# Patient Record
Sex: FEMALE | Race: WHITE | NOT HISPANIC OR LATINO | Employment: UNEMPLOYED | URBAN - METROPOLITAN AREA
[De-identification: names, ages, dates, MRNs, and addresses within clinical notes are randomized per-mention and may not be internally consistent; named-entity substitution may affect disease eponyms.]

---
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
## 2023-11-10 ENCOUNTER — LAB (OUTPATIENT)
Dept: LAB | Facility: HOSPITAL | Age: 18
End: 2023-11-10
Payer: COMMERCIAL

## 2023-11-10 ENCOUNTER — TRANSCRIBE ORDERS (OUTPATIENT)
Dept: LAB | Facility: HOSPITAL | Age: 18
End: 2023-11-10
Payer: COMMERCIAL

## 2023-11-10 DIAGNOSIS — N92.6 IRREGULAR MENSTRUAL CYCLE: ICD-10-CM

## 2023-11-10 DIAGNOSIS — N92.6 IRREGULAR MENSTRUAL CYCLE: Primary | ICD-10-CM

## 2023-11-10 LAB
FSH SERPL-ACNC: 2.04 MIU/ML
LH SERPL-ACNC: 4.05 MIU/ML
PROGEST SERPL-MCNC: 8.57 NG/ML
PROLACTIN SERPL-MCNC: 14.4 NG/ML (ref 4.79–23.3)
TSH SERPL DL<=0.05 MIU/L-ACNC: 3.09 UIU/ML (ref 0.27–4.2)

## 2023-11-10 PROCEDURE — 84146 ASSAY OF PROLACTIN: CPT

## 2023-11-10 PROCEDURE — 84443 ASSAY THYROID STIM HORMONE: CPT

## 2023-11-10 PROCEDURE — 82672 ASSAY OF ESTROGEN: CPT

## 2023-11-10 PROCEDURE — 83002 ASSAY OF GONADOTROPIN (LH): CPT

## 2023-11-10 PROCEDURE — 83001 ASSAY OF GONADOTROPIN (FSH): CPT

## 2023-11-10 PROCEDURE — 36415 COLL VENOUS BLD VENIPUNCTURE: CPT

## 2023-11-10 PROCEDURE — 84144 ASSAY OF PROGESTERONE: CPT

## 2023-11-14 LAB — ESTROGEN SERPL-MCNC: 217 PG/ML

## 2024-08-09 ENCOUNTER — NURSE TRIAGE (OUTPATIENT)
Dept: CALL CENTER | Facility: HOSPITAL | Age: 19
End: 2024-08-09
Payer: COMMERCIAL

## 2024-08-09 NOTE — TELEPHONE ENCOUNTER
"Patient c/o chest wall pain on the right side of her chest. Denies any associated symptoms. Patient would like to establish care with new primary care provider. Reviewed protocol with patient. Advised to go to Urgent Care if not able to see new PCP soon. Connected patient with Rebecca at requested primary care office to schedule appointment.    Reason for Disposition   [1] Chest pain(s) lasting a few seconds AND [2] persists > 3 days    Additional Information   Negative: SEVERE difficulty breathing (e.g., struggling for each breath, speaks in single words)   Negative: Difficult to awaken or acting confused (e.g., disoriented, slurred speech)   Negative: Shock suspected (e.g., cold/pale/clammy skin, too weak to stand, low BP, rapid pulse)   Negative: Passed out (i.e., lost consciousness, collapsed and was not responding)   Negative: [1] Chest pain lasts > 5 minutes AND [2] age > 44   Negative: [1] Chest pain lasts > 5 minutes AND [2] age > 30 AND [3] one or more cardiac risk factors (e.g., diabetes, high blood pressure, high cholesterol, smoker, or strong family history of heart disease)   Negative: [1] Chest pain lasts > 5 minutes AND [2] history of heart disease (i.e., angina, heart attack, heart failure, bypass surgery, takes nitroglycerin)   Negative: [1] Chest pain lasts > 5 minutes AND [2] described as crushing, pressure-like, or heavy   Negative: Heart beating < 50 beats per minute OR > 140 beats per minute   Negative: Visible sweat on face or sweat dripping down face   Negative: Sounds like a life-threatening emergency to the triager   Negative: Followed a chest injury   Negative: SEVERE chest pain   Negative: [1] Chest pain (or \"angina\") comes and goes AND [2] is happening more often (increasing in frequency) or getting worse (increasing in severity)  (Exception: Chest pains that last only a few seconds.)   Negative: Pain also in shoulder(s) or arm(s) or jaw  (Exception: Pain is clearly made worse by " "movement.)   Negative: Difficulty breathing   Negative: Dizziness or lightheadedness   Negative: Coughing up blood   Negative: Cocaine use within last 3 days   Negative: Major surgery in past month   Negative: Hip or leg fracture (broken bone) in past month (or had cast on leg or ankle in past month)   Negative: Illness requiring prolonged bedrest in past month (e.g., immobilization, long hospital stay)   Negative: Long-distance travel in past month (e.g., car, bus, train, plane; with trip lasting 6 or more hours)   Negative: History of prior \"blood clot\" in leg or lungs (i.e., deep vein thrombosis, pulmonary embolism)   Negative: History of inherited increased risk of blood clots (e.g., Factor 5 Leiden, Anti-thrombin 3, Protein C or Protein S deficiency, Prothrombin mutation)   Negative: Cancer treatment in past six months (or has cancer now)   Negative: [1] Chest pain lasts > 5 minutes AND [2] occurred in past 3 days (72 hours) (Exception: Feels exactly the same as previously diagnosed heartburn and has accompanying sour taste in mouth.)   Negative: Taking a deep breath makes pain worse   Negative: Patient sounds very sick or weak to the triager   Negative: [1] Chest pain lasts > 5 minutes AND [2] occurred > 3 days ago (72 hours) AND [3] NO chest pain or cardiac symptoms now   Negative: [1] Chest pain lasts < 5 minutes AND [2] NO chest pain or cardiac symptoms (e.g., breathing difficulty, sweating) now  (Exception: Chest pains that last only a few seconds.)   Negative: Fever > 100.4 F (38.0 C)   Negative: Rash in same area as pain (may be described as \"small blisters\")   Negative: [1] Patient says chest pain feels exactly the same as previously diagnosed \"heartburn\" AND [2] describes burning in chest AND [3] accompanying sour taste in mouth   Negative: [1] Chest pain lasting < 5 minutes AND [2] has not taken prescribed nitroglycerin   Negative: [1] Chest pain lasting < 5 minutes AND [2] completely gone after " "taking nitroglycerin   Negative: [1] Chest pain from known angina comes and goes AND [2] is NOT happening more often (increasing in frequency) or getting worse (increasing in severity)   Negative: [1] Chest pain(s) lasting a few seconds from coughing AND [2] persists > 3 days    Answer Assessment - Initial Assessment Questions  1. LOCATION: \"Where does it hurt?\"        Right chest, has moved a little left  2. RADIATION: \"Does the pain go anywhere else?\" (e.g., into neck, jaw, arms, back)      No   3. ONSET: \"When did the chest pain begin?\" (Minutes, hours or days)       A few weeks ago  4. PATTERN: \"Does the pain come and go, or has it been constant since it started?\"  \"Does it get worse with exertion?\"       Comes and goes  5. DURATION: \"How long does it last\" (e.g., seconds, minutes, hours)      1 hour  6. SEVERITY: \"How bad is the pain?\"  (e.g., Scale 1-10; mild, moderate, or severe)     - MILD (1-3): doesn't interfere with normal activities      - MODERATE (4-7): interferes with normal activities or awakens from sleep     - SEVERE (8-10): excruciating pain, unable to do any normal activities        2/10  7. CARDIAC RISK FACTORS: \"Do you have any history of heart problems or risk factors for heart disease?\" (e.g., angina, prior heart attack; diabetes, high blood pressure, high cholesterol, smoker, or strong family history of heart disease)      No   8. PULMONARY RISK FACTORS: \"Do you have any history of lung disease?\"  (e.g., blood clots in lung, asthma, emphysema, birth control pills)      No   9. 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 OTHER SYMPTOMS: \"Do you have any other symptoms?\" (e.g., dizziness, nausea, vomiting, sweating, fever, difficulty breathing, cough)        No   11. PREGNANCY: \"Is there any chance you are pregnant?\" \"When was your last menstrual period?\"        No    Protocols used: Chest Pain-ADULT-AH    "

## 2024-08-29 ENCOUNTER — OFFICE VISIT (OUTPATIENT)
Dept: INTERNAL MEDICINE | Facility: CLINIC | Age: 19
End: 2024-08-29
Payer: COMMERCIAL

## 2024-08-29 VITALS
SYSTOLIC BLOOD PRESSURE: 100 MMHG | HEART RATE: 86 BPM | HEIGHT: 61 IN | WEIGHT: 204.8 LBS | TEMPERATURE: 98 F | OXYGEN SATURATION: 99 % | DIASTOLIC BLOOD PRESSURE: 70 MMHG | BODY MASS INDEX: 38.67 KG/M2

## 2024-08-29 DIAGNOSIS — K58.0 IRRITABLE BOWEL SYNDROME WITH DIARRHEA: ICD-10-CM

## 2024-08-29 DIAGNOSIS — R07.2 PRECORDIAL PAIN: Primary | ICD-10-CM

## 2024-08-29 DIAGNOSIS — F41.1 GAD (GENERALIZED ANXIETY DISORDER): ICD-10-CM

## 2024-08-29 DIAGNOSIS — Z00.00 ANNUAL PHYSICAL EXAM: ICD-10-CM

## 2024-08-29 PROCEDURE — 99204 OFFICE O/P NEW MOD 45 MIN: CPT | Performed by: PHYSICIAN ASSISTANT

## 2024-08-29 PROCEDURE — 1160F RVW MEDS BY RX/DR IN RCRD: CPT | Performed by: PHYSICIAN ASSISTANT

## 2024-08-29 PROCEDURE — 1159F MED LIST DOCD IN RCRD: CPT | Performed by: PHYSICIAN ASSISTANT

## 2024-08-29 PROCEDURE — 93000 ELECTROCARDIOGRAM COMPLETE: CPT | Performed by: PHYSICIAN ASSISTANT

## 2024-08-29 RX ORDER — ESCITALOPRAM OXALATE 5 MG/1
5 TABLET ORAL DAILY
Qty: 30 TABLET | Refills: 5 | Status: SHIPPED | OUTPATIENT
Start: 2024-08-29

## 2024-08-29 NOTE — PROGRESS NOTES
[facility]  [address]  [facility] [address]  Dept: [phone]  Dept Fax: [phone]  Loc: [phone]  Loc Fax: [phone]    Jorge De Los Santos  2005    New Patient Office Note    History of Present Illness:  Patient is a 19-year-old female in today to establish care for chest pain and anxiety.  Patient reports chest pain has been coming and going off and on for the last 6 months.  Seems to be worse with exercise.  Asymptomatic today.  Last episode was 2 days ago.  Denies any other related cardiac symptoms such as dizziness, lightheadedness, syncope, or near syncopal episodes.    Patient does suffer from anxiety and has for quite some time.  Does see a therapist would like to start medication.  Denies any SI or HI.  Open to GeneSight testing as well.    Chest Pain   Pertinent negatives include no cough, dizziness, fever, headaches, nausea, palpitations, shortness of breath or vomiting.   Irritable Bowel Syndrome  Pertinent negatives include no arthralgias, chills, congestion, coughing, fatigue, fever, headaches, myalgias, nausea, rash, sore throat or vomiting.       The following portions of the patient's history were reviewed and updated as appropriate: allergies, current medications, past family history, past medical history, past social history, past surgical history, and problem list.    Medications:    Current Outpatient Medications:     escitalopram (Lexapro) 5 MG tablet, Take 1 tablet by mouth Daily., Disp: 30 tablet, Rfl: 5    Subjective  No Known Allergies     Past Medical History:   Diagnosis Date    Acid reflux     Asthma     Depression     History of migraine headaches     IBS (irritable bowel syndrome)        Past Surgical History:   Procedure Laterality Date    THYROIDECTOMY      THYROIDECTOMY, PARTIAL         Family History   Problem Relation Age of Onset    Mental illness Mother     Hypertension Mother     Migraine headaches  "Mother     Mental illness Father     Obesity Maternal Grandmother         Social History     Socioeconomic History    Marital status: Single   Tobacco Use    Smoking status: Never    Smokeless tobacco: Never   Vaping Use    Vaping status: Never Used   Substance and Sexual Activity    Alcohol use: Never    Drug use: Never    Sexual activity: Defer       Review of Systems   Constitutional:  Negative for activity change, chills, fatigue, fever and unexpected weight change.   HENT:  Negative for congestion, ear pain, postnasal drip, sinus pressure and sore throat.    Eyes:  Negative for pain, discharge and redness.   Respiratory:  Negative for cough, shortness of breath and wheezing.    Cardiovascular:  Negative for palpitations and leg swelling.   Gastrointestinal:  Negative for diarrhea, nausea and vomiting.   Endocrine: Negative for cold intolerance and heat intolerance.   Genitourinary:  Negative for decreased urine volume and dysuria.   Musculoskeletal:  Negative for arthralgias and myalgias.   Skin:  Negative for rash and wound.   Neurological:  Negative for dizziness, light-headedness and headaches.   Hematological:  Does not bruise/bleed easily.   Psychiatric/Behavioral:  Negative for confusion, dysphoric mood, self-injury, sleep disturbance and suicidal ideas. The patient is nervous/anxious.        Objective  Vitals:    08/29/24 1252   BP: 100/70   BP Location: Left arm   Patient Position: Sitting   Cuff Size: Large Adult   Pulse: 86   Temp: 98 °F (36.7 °C)   TempSrc: Temporal   SpO2: 99%   Weight: 92.9 kg (204 lb 12.8 oz)   Height: 154.9 cm (60.98\")       Physical Exam  Physical Exam  Vitals and nursing note reviewed.   Constitutional:       General: She is not in acute distress.     Appearance: She is not ill-appearing.   HENT:      Head: Normocephalic.      Right Ear: Tympanic membrane, ear canal and external ear normal. There is no impacted cerumen.      Left Ear: Tympanic membrane, ear canal and external " ear normal. There is no impacted cerumen.      Nose: No congestion or rhinorrhea.      Mouth/Throat:      Mouth: Mucous membranes are moist.      Pharynx: Oropharynx is clear. No oropharyngeal exudate or posterior oropharyngeal erythema.   Eyes:      General:         Right eye: No discharge.         Left eye: No discharge.      Extraocular Movements: Extraocular movements intact.      Conjunctiva/sclera: Conjunctivae normal.      Pupils: Pupils are equal, round, and reactive to light.   Cardiovascular:      Rate and Rhythm: Normal rate and regular rhythm.      Heart sounds: Normal heart sounds. No murmur heard.     No friction rub. No gallop.   Pulmonary:      Effort: Pulmonary effort is normal. No respiratory distress.      Breath sounds: Normal breath sounds. No wheezing.   Abdominal:      General: Bowel sounds are normal. There is no distension.      Palpations: Abdomen is soft. There is no mass.      Tenderness: There is no abdominal tenderness.   Musculoskeletal:         General: No swelling. Normal range of motion.      Cervical back: Normal range of motion. No tenderness.      Right lower leg: No edema.      Left lower leg: No edema.   Lymphadenopathy:      Cervical: No cervical adenopathy.   Skin:     Findings: No bruising, erythema or rash.   Neurological:      Mental Status: She is oriented to person, place, and time.      Gait: Gait normal.   Psychiatric:         Mood and Affect: Mood normal.         Behavior: Behavior normal.         Thought Content: Thought content normal.         Judgment: Judgment normal.       Diagnostic Data    ECG 12 Lead    Date/Time: 8/29/2024 1:40 PM  Performed by: Krishan Cast PA-C    Authorized by: Krishan Cast PA-C  Comparison: not compared with previous ECG   Previous ECG: no previous ECG available  Rhythm: sinus rhythm and sinus arrhythmia  Rate: normal  Conduction: non-specific intraventricular conduction delay  Q waves: V1, V4, V2 and V3    QRS axis:  normal  Other findings: left atrial abnormality    Clinical impression: abnormal EKG      Assessment  Diagnoses and all orders for this visit:    1. Precordial pain (Primary)  -     Ambulatory Referral [facility]    2. Irritable bowel syndrome with diarrhea  -     Ambulatory Referral to Gastroenterology    3. Annual physical exam  -     Vitamin B12 & Folate  -     Lipid Panel  -     Hemoglobin A1c; Future  -     TSH Rfx On Abnormal To Free T4  -     Comprehensive Metabolic Panel  -     CBC (No Diff)  -     Hepatitis C Antibody    4. NYDIA (generalized anxiety disorder)  -     GeneSight - Swab,; Future    Other orders  -     escitalopram (Lexapro) 5 MG tablet; Take 1 tablet by mouth Daily.  Dispense: 30 tablet; Refill: 5        Plan    1. Precordial pain (Primary)- asymptomatic today.  EKG in office reveals sinus rhythm with sinus arrhythmia, possible left atrial enlargement, possible right ventricular conduction delay, anteroseptal myocardial infarction of indeterminate age without any evidence of acute ST abnormalities.  Advised for return of symptoms or for any additional cardiac symptoms to call 911 immediately. Patient verbalized understanding of all instructions given and complied.  Referred urgently to cardiology.    2. Irritable bowel syndrome with diarrhea-  Ambulatory Referral to Gastroenterology    3. Annual physical exam- ordered fasting labs and follow-up with these.  Advised on nutrition and follow-up with this.    4. NYDIA (generalized anxiety disorder)- worse, start trial of Lexapro.  Advised for any side effects to stop taking and call back immediately.  Obtain GeneSight test.      Return in about 2 weeks (around 9/12/2024) for Recheck.    Krishan Cast PA-C  08/29/2024

## 2024-08-30 ENCOUNTER — PATIENT ROUNDING (BHMG ONLY) (OUTPATIENT)
Dept: INTERNAL MEDICINE | Facility: CLINIC | Age: 19
End: 2024-08-30
Payer: COMMERCIAL

## 2024-08-30 NOTE — PROGRESS NOTES
A  my chart message has been sent to the patient for patient rounding with [facility].

## 2024-09-05 ENCOUNTER — LAB (OUTPATIENT)
Dept: INTERNAL MEDICINE | Facility: CLINIC | Age: 19
End: 2024-09-05
Payer: COMMERCIAL

## 2024-09-05 DIAGNOSIS — Z00.00 ANNUAL PHYSICAL EXAM: ICD-10-CM

## 2024-09-05 DIAGNOSIS — F41.1 GAD (GENERALIZED ANXIETY DISORDER): Primary | ICD-10-CM

## 2024-09-05 LAB
ALBUMIN SERPL-MCNC: 4.4 G/DL (ref 3.5–5.2)
ALBUMIN/GLOB SERPL: 1.4 G/DL
ALP SERPL-CCNC: 103 U/L (ref 39–117)
ALT SERPL W P-5'-P-CCNC: 15 U/L (ref 1–33)
ANION GAP SERPL CALCULATED.3IONS-SCNC: 9.5 MMOL/L (ref 5–15)
AST SERPL-CCNC: 19 U/L (ref 1–32)
BILIRUB SERPL-MCNC: 0.5 MG/DL (ref 0–1.2)
BUN SERPL-MCNC: 10 MG/DL (ref 6–20)
BUN/CREAT SERPL: 14.7 (ref 7–25)
CALCIUM SPEC-SCNC: 10 MG/DL (ref 8.6–10.5)
CHLORIDE SERPL-SCNC: 105 MMOL/L (ref 98–107)
CHOLEST SERPL-MCNC: 157 MG/DL (ref 0–200)
CO2 SERPL-SCNC: 23.5 MMOL/L (ref 22–29)
CREAT SERPL-MCNC: 0.68 MG/DL (ref 0.57–1)
DEPRECATED RDW RBC AUTO: 41 FL (ref 37–54)
EGFRCR SERPLBLD CKD-EPI 2021: 127.3 ML/MIN/1.73
ERYTHROCYTE [DISTWIDTH] IN BLOOD BY AUTOMATED COUNT: 13.3 % (ref 12.3–15.4)
GLOBULIN UR ELPH-MCNC: 3.2 GM/DL
GLUCOSE SERPL-MCNC: 84 MG/DL (ref 65–99)
HBA1C MFR BLD: 5.1 % (ref 4.8–5.6)
HCT VFR BLD AUTO: 42 % (ref 34–46.6)
HCV AB SER QL: NORMAL
HDLC SERPL-MCNC: 41 MG/DL (ref 40–60)
HGB BLD-MCNC: 14.1 G/DL (ref 12–15.9)
LDLC SERPL CALC-MCNC: 100 MG/DL (ref 0–100)
LDLC/HDLC SERPL: 2.42 {RATIO}
MCH RBC QN AUTO: 28.7 PG (ref 26.6–33)
MCHC RBC AUTO-ENTMCNC: 33.6 G/DL (ref 31.5–35.7)
MCV RBC AUTO: 85.5 FL (ref 79–97)
PLATELET # BLD AUTO: 322 10*3/MM3 (ref 140–450)
PMV BLD AUTO: 10.1 FL (ref 6–12)
POTASSIUM SERPL-SCNC: 4.1 MMOL/L (ref 3.5–5.2)
PROT SERPL-MCNC: 7.6 G/DL (ref 6–8.5)
RBC # BLD AUTO: 4.91 10*6/MM3 (ref 3.77–5.28)
SODIUM SERPL-SCNC: 138 MMOL/L (ref 136–145)
TRIGL SERPL-MCNC: 84 MG/DL (ref 0–150)
TSH SERPL DL<=0.05 MIU/L-ACNC: 1.16 UIU/ML (ref 0.27–4.2)
VLDLC SERPL-MCNC: 16 MG/DL (ref 5–40)
WBC NRBC COR # BLD AUTO: 7.81 10*3/MM3 (ref 3.4–10.8)

## 2024-09-05 PROCEDURE — 80050 GENERAL HEALTH PANEL: CPT | Performed by: PHYSICIAN ASSISTANT

## 2024-09-05 PROCEDURE — 83036 HEMOGLOBIN GLYCOSYLATED A1C: CPT | Performed by: PHYSICIAN ASSISTANT

## 2024-09-05 PROCEDURE — 80061 LIPID PANEL: CPT | Performed by: PHYSICIAN ASSISTANT

## 2024-09-05 PROCEDURE — 36415 COLL VENOUS BLD VENIPUNCTURE: CPT | Performed by: PHYSICIAN ASSISTANT

## 2024-09-05 PROCEDURE — 82746 ASSAY OF FOLIC ACID SERUM: CPT | Performed by: PHYSICIAN ASSISTANT

## 2024-09-05 PROCEDURE — 82607 VITAMIN B-12: CPT | Performed by: PHYSICIAN ASSISTANT

## 2024-09-05 PROCEDURE — 86803 HEPATITIS C AB TEST: CPT | Performed by: PHYSICIAN ASSISTANT

## 2024-09-06 LAB
FOLATE SERPL-MCNC: 12.6 NG/ML (ref 4.78–24.2)
VIT B12 BLD-MCNC: 333 PG/ML (ref 211–946)

## 2024-09-06 RX ORDER — LANOLIN ALCOHOL/MO/W.PET/CERES
1000 CREAM (GRAM) TOPICAL DAILY
Qty: 90 TABLET | Refills: 1 | Status: SHIPPED | OUTPATIENT
Start: 2024-09-06

## 2024-09-09 ENCOUNTER — OFFICE VISIT (OUTPATIENT)
Dept: CARDIOLOGY | Facility: HOSPITAL | Age: 19
End: 2024-09-09
Payer: COMMERCIAL

## 2024-09-09 VITALS
OXYGEN SATURATION: 97 % | BODY MASS INDEX: 37.76 KG/M2 | TEMPERATURE: 97.4 F | HEART RATE: 73 BPM | RESPIRATION RATE: 18 BRPM | HEIGHT: 61 IN | WEIGHT: 200 LBS | SYSTOLIC BLOOD PRESSURE: 117 MMHG | DIASTOLIC BLOOD PRESSURE: 71 MMHG

## 2024-09-09 DIAGNOSIS — R07.89 OTHER CHEST PAIN: Primary | ICD-10-CM

## 2024-09-09 DIAGNOSIS — R53.83 OTHER FATIGUE: ICD-10-CM

## 2024-09-09 DIAGNOSIS — R06.09 DOE (DYSPNEA ON EXERTION): ICD-10-CM

## 2024-09-09 PROCEDURE — 99203 OFFICE O/P NEW LOW 30 MIN: CPT | Performed by: NURSE PRACTITIONER

## 2024-09-09 PROCEDURE — 1160F RVW MEDS BY RX/DR IN RCRD: CPT | Performed by: NURSE PRACTITIONER

## 2024-09-09 PROCEDURE — 1159F MED LIST DOCD IN RCRD: CPT | Performed by: NURSE PRACTITIONER

## 2024-09-10 NOTE — PROGRESS NOTES
"Chief Complaint  Establish Care and Chest Pain    Subjective    History of Present Illness {CC  Problem List  Visit  Diagnosis   Encounters  Notes  Medications  Labs  Result Review Imaging  Media :23}       History of Present Illness   19 year old female presents to the office today for ongoing evaluation of her chest pain. Notes that chest pain has been occurring intermittently for the past few weeks with some associated N/T in her left arm. She notes that pain can occur at rest or with exertion. Notes that chest pain is located in center and left of her chest. She also reports intermittent BRUNO and fatigue. PMH of gerd, asthma, depression, IBS, migraines   Objective     Vital Signs:   Vitals:    09/09/24 1530 09/09/24 1532   BP: 118/68 117/71   BP Location: Left arm Left arm   Patient Position: Standing Sitting   Cuff Size: Adult Adult   Pulse: 95 73   Resp:  18   Temp:  97.4 °F (36.3 °C)   TempSrc:  Temporal   SpO2: 98% 97%   Weight:  90.7 kg (200 lb)   Height:  154.9 cm (60.98\")     Body mass index is 37.81 kg/m².  Physical Exam  Vitals and nursing note reviewed.   Constitutional:       Appearance: Normal appearance.   HENT:      Head: Normocephalic.   Eyes:      Pupils: Pupils are equal, round, and reactive to light.   Cardiovascular:      Rate and Rhythm: Normal rate and regular rhythm.      Pulses: Normal pulses.      Heart sounds: Normal heart sounds. No murmur heard.  Pulmonary:      Effort: Pulmonary effort is normal.      Breath sounds: Normal breath sounds.   Abdominal:      General: Bowel sounds are normal.      Palpations: Abdomen is soft.   Musculoskeletal:         General: Normal range of motion.      Cervical back: Normal range of motion.      Right lower leg: No edema.      Left lower leg: No edema.   Skin:     General: Skin is warm and dry.      Capillary Refill: Capillary refill takes less than 2 seconds.   Neurological:      Mental Status: She is alert and oriented to person, place, and " time.   Psychiatric:         Mood and Affect: Mood normal.         Thought Content: Thought content normal.              Result Review  Data Reviewed:{ Labs  Result Review  Imaging  Med Tab  Media :23}   ECG 12 Lead (08/29/2024 13:40)   LABS SCANNED (09/09/2024)  CBC with Auto Diff (09/05/2024 18:41)  COMPREHENSIVE METABOLIC PANEL (09/05/2024 18:41)  LIPASE (09/05/2024 18:41)  High Sensitivity Troponin I (09/05/2024 18:41)           Assessment and Plan {CC Problem List  Visit Diagnosis  ROS  Review (Popup)  Health Maintenance  Quality  BestPractice  Medications  SmartSets  SnapShot Encounters  Media :23}   1. Other chest pain    - Adult Transthoracic Echo Complete W/ Cont if Necessary Per Protocol; Future    2. BRUNO (dyspnea on exertion)    - Adult Transthoracic Echo Complete W/ Cont if Necessary Per Protocol; Future    3. Other fatigue    - Adult Transthoracic Echo Complete W/ Cont if Necessary Per Protocol; Future        Follow Up {Instructions Charge Capture  Follow-up Communications :23}   Return in about 17 days (around 9/26/2024) for Telemedicine visit, braulio.    Patient was given instructions and counseling regarding her condition or for health maintenance advice. Please see specific information pulled into the AVS if appropriate.  Patient was instructed to call the [facility] with any questions, concerns, or worsening symptoms.

## 2024-09-16 ENCOUNTER — OFFICE VISIT (OUTPATIENT)
Dept: INTERNAL MEDICINE | Facility: CLINIC | Age: 19
End: 2024-09-16
Payer: COMMERCIAL

## 2024-09-16 VITALS
HEART RATE: 76 BPM | TEMPERATURE: 96.6 F | BODY MASS INDEX: 37.38 KG/M2 | DIASTOLIC BLOOD PRESSURE: 68 MMHG | HEIGHT: 61 IN | SYSTOLIC BLOOD PRESSURE: 100 MMHG | OXYGEN SATURATION: 98 % | WEIGHT: 198 LBS

## 2024-09-16 DIAGNOSIS — F41.1 GAD (GENERALIZED ANXIETY DISORDER): Primary | ICD-10-CM

## 2024-09-16 PROCEDURE — 1126F AMNT PAIN NOTED NONE PRSNT: CPT | Performed by: PHYSICIAN ASSISTANT

## 2024-09-16 PROCEDURE — 1159F MED LIST DOCD IN RCRD: CPT | Performed by: PHYSICIAN ASSISTANT

## 2024-09-16 PROCEDURE — 99213 OFFICE O/P EST LOW 20 MIN: CPT | Performed by: PHYSICIAN ASSISTANT

## 2024-09-16 PROCEDURE — 1160F RVW MEDS BY RX/DR IN RCRD: CPT | Performed by: PHYSICIAN ASSISTANT

## 2024-09-16 RX ORDER — ESCITALOPRAM OXALATE 5 MG/1
5 TABLET ORAL DAILY
Qty: 90 TABLET | Refills: 1 | Status: SHIPPED | OUTPATIENT
Start: 2024-09-16

## 2024-09-24 ENCOUNTER — TELEPHONE (OUTPATIENT)
Dept: CARDIOLOGY | Facility: HOSPITAL | Age: 19
End: 2024-09-24
Payer: COMMERCIAL

## 2024-10-17 ENCOUNTER — HOSPITAL ENCOUNTER (OUTPATIENT)
Dept: CARDIOLOGY | Facility: HOSPITAL | Age: 19
Discharge: HOME | End: 2024-10-17
Admitting: NURSE PRACTITIONER
Payer: COMMERCIAL

## 2024-10-17 DIAGNOSIS — R07.89 OTHER CHEST PAIN: ICD-10-CM

## 2024-10-17 DIAGNOSIS — R06.09 DOE (DYSPNEA ON EXERTION): ICD-10-CM

## 2024-10-17 DIAGNOSIS — R53.83 OTHER FATIGUE: ICD-10-CM

## 2024-10-17 LAB
BH CV ECHO MEAS - AO MAX PG: 6.3 MMHG
BH CV ECHO MEAS - AO MEAN PG: 3 MMHG
BH CV ECHO MEAS - AO ROOT DIAM: 2.5 CM
BH CV ECHO MEAS - AO V2 MAX: 125 CM/SEC
BH CV ECHO MEAS - AO V2 VTI: 28.7 CM
BH CV ECHO MEAS - AVA(I,D): 1.71 CM2
BH CV ECHO MEAS - EDV(CUBED): 103.8 ML
BH CV ECHO MEAS - EDV(MOD-SP2): 140 ML
BH CV ECHO MEAS - EDV(MOD-SP4): 108 ML
BH CV ECHO MEAS - EF(MOD-BP): 55.1 %
BH CV ECHO MEAS - EF(MOD-SP2): 56.3 %
BH CV ECHO MEAS - EF(MOD-SP4): 56 %
BH CV ECHO MEAS - ESV(CUBED): 27 ML
BH CV ECHO MEAS - ESV(MOD-SP2): 61.2 ML
BH CV ECHO MEAS - ESV(MOD-SP4): 47.5 ML
BH CV ECHO MEAS - FS: 36.2 %
BH CV ECHO MEAS - IVS/LVPW: 0.67 CM
BH CV ECHO MEAS - IVSD: 0.6 CM
BH CV ECHO MEAS - LA DIMENSION: 3.8 CM
BH CV ECHO MEAS - LAT PEAK E' VEL: 17.7 CM/SEC
BH CV ECHO MEAS - LV DIASTOLIC VOL/BSA (35-75): 58.2 CM2
BH CV ECHO MEAS - LV MASS(C)D: 112.5 GRAMS
BH CV ECHO MEAS - LV MAX PG: 3.9 MMHG
BH CV ECHO MEAS - LV MEAN PG: 2 MMHG
BH CV ECHO MEAS - LV SYSTOLIC VOL/BSA (12-30): 25.6 CM2
BH CV ECHO MEAS - LV V1 MAX: 98.5 CM/SEC
BH CV ECHO MEAS - LV V1 VTI: 21.6 CM
BH CV ECHO MEAS - LVIDD: 4.7 CM
BH CV ECHO MEAS - LVIDS: 3 CM
BH CV ECHO MEAS - LVOT AREA: 2.27 CM2
BH CV ECHO MEAS - LVOT DIAM: 1.7 CM
BH CV ECHO MEAS - LVPWD: 0.9 CM
BH CV ECHO MEAS - MED PEAK E' VEL: 11.4 CM/SEC
BH CV ECHO MEAS - MV A MAX VEL: 62.4 CM/SEC
BH CV ECHO MEAS - MV DEC SLOPE: 393 CM/SEC2
BH CV ECHO MEAS - MV E MAX VEL: 100 CM/SEC
BH CV ECHO MEAS - MV E/A: 1.6
BH CV ECHO MEAS - MV MAX PG: 6 MMHG
BH CV ECHO MEAS - MV MEAN PG: 2 MMHG
BH CV ECHO MEAS - MV P1/2T: 92.4 MSEC
BH CV ECHO MEAS - MV V2 VTI: 34.5 CM
BH CV ECHO MEAS - MVA(P1/2T): 2.38 CM2
BH CV ECHO MEAS - MVA(VTI): 1.42 CM2
BH CV ECHO MEAS - PA ACC TIME: 0.12 SEC
BH CV ECHO MEAS - SV(LVOT): 49 ML
BH CV ECHO MEAS - SV(MOD-SP2): 78.8 ML
BH CV ECHO MEAS - SV(MOD-SP4): 60.5 ML
BH CV ECHO MEAS - SVI(LVOT): 26.4 ML/M2
BH CV ECHO MEAS - SVI(MOD-SP2): 42.5 ML/M2
BH CV ECHO MEAS - SVI(MOD-SP4): 32.6 ML/M2
BH CV ECHO MEAS - TAPSE (>1.6): 2.36 CM
BH CV ECHO MEASUREMENTS AVERAGE E/E' RATIO: 6.87
BH CV XLRA - RV BASE: 3.3 CM
BH CV XLRA - RV LENGTH: 3.6 CM
BH CV XLRA - RV MID: 2.3 CM
BH CV XLRA - TDI S': 13.3 CM/SEC
LEFT ATRIUM VOLUME INDEX: 22.1 ML/M2
LV EF 2D ECHO EST: 55 %

## 2024-10-17 PROCEDURE — 93306 TTE W/DOPPLER COMPLETE: CPT

## 2024-10-17 NOTE — PROGRESS NOTES
Your echo was normal and showed that your heart is pumping efficiently.  No valvular disease was noted.

## 2024-10-25 RX ORDER — ESCITALOPRAM OXALATE 10 MG/1
10 TABLET ORAL DAILY
Qty: 30 TABLET | Refills: 1 | Status: SHIPPED | OUTPATIENT
Start: 2024-10-25

## 2024-10-29 ENCOUNTER — OFFICE VISIT (OUTPATIENT)
Dept: GASTROENTEROLOGY | Facility: CLINIC | Age: 19
End: 2024-10-29
Payer: COMMERCIAL

## 2024-10-29 VITALS
DIASTOLIC BLOOD PRESSURE: 63 MMHG | TEMPERATURE: 97.5 F | BODY MASS INDEX: 37.72 KG/M2 | WEIGHT: 199.8 LBS | SYSTOLIC BLOOD PRESSURE: 106 MMHG | HEIGHT: 61 IN | HEART RATE: 65 BPM

## 2024-10-29 DIAGNOSIS — R14.0 BLOATING: Primary | ICD-10-CM

## 2024-10-29 DIAGNOSIS — K59.04 CHRONIC IDIOPATHIC CONSTIPATION: ICD-10-CM

## 2024-10-29 DIAGNOSIS — K21.9 GASTROESOPHAGEAL REFLUX DISEASE, UNSPECIFIED WHETHER ESOPHAGITIS PRESENT: ICD-10-CM

## 2024-10-29 PROCEDURE — 99214 OFFICE O/P EST MOD 30 MIN: CPT

## 2024-10-29 RX ORDER — VONOPRAZAN FUMARATE 13.36 MG/1
10 TABLET ORAL DAILY
Qty: 30 TABLET | Refills: 11 | Status: SHIPPED | OUTPATIENT
Start: 2024-10-29

## 2024-10-29 NOTE — PATIENT INSTRUCTIONS
Diet for bloating  Here is a list of low-FODMAP (fermentable oligosaccharides disaccharides monosaccharides and polyols) foods you can try eliminating from your diet, to see if it has any effect on your symptoms.    Fermentable: Wheat, barley, rye, onion, leak, white part of spring onion, garlic, shallots, artichokes, beet root, phenyl, peas, chicory, pistachio, cashews, legumes, lentils, and chickpeas  Oligosaccharides: Milk, custard, ice cream, and yogurt  Monosaccharides: Apples, pears, mangoes, cherries, watermelon, asparagus, sugar snap peas, honey, hypertense corn syrup  Polyols: Apples, pears, apricots, cherries, nectarines, peaches, plums, watermelon, mushrooms, cauliflower, artificially sweetened chewing gum and confectionary       Constipation  Recommend bowel cleanse with Miralax, which is over the counter. Dissolve 5 capfuls into 32 ounces of sugar-free gatorade/powerade and drink 8 ounces every 30 minutes until gone. Follow this with regular use of Miralax, titrating to achieve/maintain soft/formed bowel movements. Miralax is safe to use long-term. You can also repeat the bowel cleanse as needed.   Recommend 25-30 grams of fiber daily. May use Fibercon tablets to supplement as needed. Fibercon is less likely to cause gas/bloating.   Drink 64-80 ounces of water daily, with ideally half of that containing electrolytes (sugar free gatorade/powerade, electrolyte tablets)  Exercise is helpful for maintaining healthy bowel habits.

## 2024-10-29 NOTE — PROGRESS NOTES
Office Note     Name: Jorge De Los Santos    : 2005     MRN: [id number]     Chief Complaint  Constipation    Subjective     History of Present Illness:  Jorge De Los Santos is a 19 y.o. female who presents today for evaluation of GERD symptoms and constipation. She initially had chest pain in February with subsequent reassuring cardiac workup, but was told she likely had GERD. She briefly took omeprazole 20 mg daily, which controlled her symptoms for a while, but she felt like it eventually started to worsen her epigastric pain so she stopped it.     She is reporting lifelong history of constipation, with significant bloating and nausea/vomiting. She reports that she would go weeks without having a bowel movement without the aid of OTC medication. She reports lactose intolerance, and will sometimes drink milk to produce a bowel movement. When she does have a bowel movement, it's typically consistent with # 3 or #5 on bristol stool scale.     She also reports intolerance to gluten. Her roommate has Celiac disease and she has noticed that many of her GI symptoms improve when she eats gluten free meals they prepare.     She thinks she's had hemorrhoid in the past that ruptured/bled, but otherwise denies any rectal bleeding. Denies family history of GI malignancy or IBD.      Past Medical History:   Past Medical History:   Diagnosis Date    Acid reflux     Asthma     Depression     History of migraine headaches     IBS (irritable bowel syndrome)        Past Surgical History:   Past Surgical History:   Procedure Laterality Date    THYROIDECTOMY      THYROIDECTOMY, PARTIAL         Immunizations:   Immunization History   Administered Date(s) Administered    DTaP 2003, 2004, 2004, 2004, 2005, 2005, 10/31/2005, 2008    DTaP / HiB 2006    DTaP, Unspecified 2003, 2004, 2004, 2004, 2008    H1N1 All Forms 03/10/2010    H1N1 Inj Preservative Free  01/20/2010    Hep A, 2 Dose 05/25/2018, 11/27/2018    Hep B, Adolescent or Pediatric 2003, 01/05/2004, 03/12/2004, 02/08/2005, 04/25/2005, 09/28/2005    Hepatitis B Adult/Adolescent IM 2003, 01/05/2004, 03/12/2004    HiB 2005, 01/05/2004, 04/03/2004, 04/25/2005, 09/28/2005, 10/31/2005    Hib (PRP-OMP) 2003, 01/05/2004, 04/03/2004, 04/25/2005    IPV 2003, 01/05/2004, 03/12/2004, 04/25/2005, 09/28/2005, 06/23/2006, 07/29/2008    MCV4 Unspecified 07/03/2015    MMR 08/04/2004, 06/23/2006, 07/29/2008    Meningococcal MCV4P (Menactra) 08/09/2019    Meningococcal Polysaccharide 07/03/2015    Meningococcal, Unspecified 07/03/2015    PEDS-Pneumococcal Conjugate (PCV7) 2003, 01/05/2004, 03/12/2004, 12/09/2004, 06/23/2006, 08/25/2006    PPD Test 07/29/2008    Tdap 07/03/2015    Varicella 08/04/2004, 08/25/2006, 07/31/2013        Medications:     Current Outpatient Medications:     escitalopram (Lexapro) 10 MG tablet, Take 1 tablet by mouth Daily., Disp: 30 tablet, Rfl: 1    vitamin B-12 (CYANOCOBALAMIN) 1000 MCG tablet, Take 1 tablet by mouth Daily., Disp: 90 tablet, Rfl: 1    omeprazole (priLOSEC) 20 MG capsule, Take 1 capsule by mouth Daily. (Patient not taking: Reported on 10/29/2024), Disp: , Rfl:     Allergies:   No Known Allergies    Family History:   Family History   Problem Relation Age of Onset    Mental illness Mother     Hypertension Mother     Migraine headaches Mother     Mental illness Father     Obesity Maternal Grandmother     Colon cancer Neg Hx        Social History:   Social History     Socioeconomic History    Marital status: Single   Tobacco Use    Smoking status: Never    Smokeless tobacco: Never   Vaping Use    Vaping status: Never Used   Substance and Sexual Activity    Alcohol use: Never    Drug use: Never    Sexual activity: Defer         Objective     Vital Signs  /63 (BP Location: Left arm, Patient Position: Sitting, Cuff Size: Adult)   Pulse 65   Temp  "97.5 °F (36.4 °C) (Temporal)   Ht 154.9 cm (60.98\")   Wt 90.6 kg (199 lb 12.8 oz)   BMI 37.78 kg/m²   Estimated body mass index is 37.78 kg/m² as calculated from the following:    Height as of this encounter: 154.9 cm (60.98\").    Weight as of this encounter: 90.6 kg (199 lb 12.8 oz).            Physical Exam  Vitals reviewed.   Constitutional:       General: She is awake.   Cardiovascular:      Rate and Rhythm: Normal rate.   Pulmonary:      Effort: Pulmonary effort is normal.   Abdominal:      Palpations: Abdomen is soft.      Tenderness: There is no abdominal tenderness.   Skin:     General: Skin is warm and dry.   Neurological:      Mental Status: She is alert.        Assessment and Plan     Assessment & Plan  Bloating  With noted gluten intolerance, will check Celiac panel. If positive, plan for EGD for confirmation.   Likely related to her constipation. Hopeful this will improve with control of constipation.   Low FODMAP diet provided and discussed.   Chronic idiopathic constipation  Begin Linzess 145 mcg daily. Recommend cleanse with Miralax prior to initiation. Instructions provided in office.   Recommend at least 25 grams of fiber daily, using fibercon tablets to supplement as needed.   Gastroesophageal reflux disease, unspecified whether esophagitis present  She reported worsening of symptoms with omeprazole. Will try Voquezna 10 mg daily to see if a different drug class will be effective for her. Also may improve with improvement in constipation.              Follow Up  As needed    LISS [place]  [facility] [address] [place] [address] GASTROENTEROLOGY  [address] [place] [id number]    "

## 2024-10-30 ENCOUNTER — TELEPHONE (OUTPATIENT)
Dept: INTERNAL MEDICINE | Facility: CLINIC | Age: 19
End: 2024-10-30
Payer: COMMERCIAL

## 2024-10-30 RX ORDER — ESCITALOPRAM OXALATE 10 MG/1
10 TABLET ORAL DAILY
Qty: 30 TABLET | Refills: 1 | Status: SHIPPED | OUTPATIENT
Start: 2024-10-30

## 2024-10-30 NOTE — TELEPHONE ENCOUNTER
Hub can read and schedule:    Left message to see if patient can schedule a telehealth appointment for Friday (per provider request) to discuss the documentation she needed.

## 2024-12-09 RX ORDER — LANOLIN ALCOHOL/MO/W.PET/CERES
1000 CREAM (GRAM) TOPICAL DAILY
Qty: 90 TABLET | Refills: 1 | Status: SHIPPED | OUTPATIENT
Start: 2024-12-09

## 2024-12-09 RX ORDER — ESCITALOPRAM OXALATE 10 MG/1
10 TABLET ORAL DAILY
Qty: 30 TABLET | Refills: 1 | Status: SHIPPED | OUTPATIENT
Start: 2024-12-09

## 2024-12-19 ENCOUNTER — OFFICE VISIT (OUTPATIENT)
Dept: INTERNAL MEDICINE | Facility: CLINIC | Age: 19
End: 2024-12-19
Payer: COMMERCIAL

## 2024-12-19 VITALS
DIASTOLIC BLOOD PRESSURE: 70 MMHG | OXYGEN SATURATION: 98 % | TEMPERATURE: 96.8 F | BODY MASS INDEX: 39.42 KG/M2 | WEIGHT: 208.8 LBS | HEART RATE: 76 BPM | SYSTOLIC BLOOD PRESSURE: 100 MMHG | HEIGHT: 61 IN

## 2024-12-19 DIAGNOSIS — K58.0 IRRITABLE BOWEL SYNDROME WITH DIARRHEA: ICD-10-CM

## 2024-12-19 DIAGNOSIS — E66.09 CLASS 2 OBESITY DUE TO EXCESS CALORIES WITH BODY MASS INDEX (BMI) OF 39.0 TO 39.9 IN ADULT, UNSPECIFIED WHETHER SERIOUS COMORBIDITY PRESENT: ICD-10-CM

## 2024-12-19 DIAGNOSIS — E53.8 B12 DEFICIENCY: ICD-10-CM

## 2024-12-19 DIAGNOSIS — K21.9 GASTROESOPHAGEAL REFLUX DISEASE, UNSPECIFIED WHETHER ESOPHAGITIS PRESENT: ICD-10-CM

## 2024-12-19 DIAGNOSIS — F41.1 GAD (GENERALIZED ANXIETY DISORDER): Primary | ICD-10-CM

## 2024-12-19 DIAGNOSIS — E66.812 CLASS 2 OBESITY DUE TO EXCESS CALORIES WITH BODY MASS INDEX (BMI) OF 39.0 TO 39.9 IN ADULT, UNSPECIFIED WHETHER SERIOUS COMORBIDITY PRESENT: ICD-10-CM

## 2024-12-19 PROCEDURE — 1126F AMNT PAIN NOTED NONE PRSNT: CPT | Performed by: PHYSICIAN ASSISTANT

## 2024-12-19 PROCEDURE — 1160F RVW MEDS BY RX/DR IN RCRD: CPT | Performed by: PHYSICIAN ASSISTANT

## 2024-12-19 PROCEDURE — 1159F MED LIST DOCD IN RCRD: CPT | Performed by: PHYSICIAN ASSISTANT

## 2024-12-19 PROCEDURE — 99214 OFFICE O/P EST MOD 30 MIN: CPT | Performed by: PHYSICIAN ASSISTANT

## 2024-12-19 NOTE — PROGRESS NOTES
[facility] PR[facility]  [address]  [facility] [address]  Dept: [phone]  Dept Fax: [phone]  Loc: [phone]  Loc Fax: [phone]    Jorge De Los Santos  2005    Follow Up Office Visit Note    History of Present Illness:  Patient is a 19-year-old female in today for follow-up for irritable bowel syndrome.  Has seen GI.  On Linzess for this.  Patient continues to struggle with weight gain and weight fluctuation.  Agreeable to seeing dietitian at this point.      Symptoms include: abdominal pain.   Pertinent negative symptoms include no anorexia, no joint pain, no change in stool, no chest pain, no chills, no congestion, no cough, no diaphoresis, no fatigue, no fever, no headaches, no joint swelling, no myalgias, no nausea, no neck pain, no numbness, no rash, no sore throat, no swollen glands, no dysuria, no vertigo, no visual change, no vomiting and no weakness.   Additional information: Its mostly just a check -up from where i started the medication.      The following portions of the patient's history were reviewed and updated as appropriate: allergies, current medications, past family history, past medical history, past social history, past surgical history, and problem list.    Medications:    Current Outpatient Medications:     escitalopram (Lexapro) 10 MG tablet, Take 1 tablet by mouth Daily., Disp: 30 tablet, Rfl: 1    linaclotide (LINZESS) 145 MCG capsule capsule, Take 1 capsule by mouth Every Morning Before Breakfast., Disp: 90 capsule, Rfl: 3    vitamin B-12 (CYANOCOBALAMIN) 1000 MCG tablet, Take 1 tablet by mouth Daily., Disp: 90 tablet, Rfl: 1    Vonoprazan Fumarate (Voquezna) 10 MG tablet, Take 1 tablet by mouth Daily., Disp: 30 tablet, Rfl: 11    Subjective  No Known Allergies     Past Medical History:   Diagnosis Date    Acid reflux     Asthma     Depression     History of migraine headaches     IBS (irritable bowel syndrome)        Past  "Surgical History:   Procedure Laterality Date    THYROIDECTOMY      THYROIDECTOMY, PARTIAL         Family History   Problem Relation Age of Onset    Mental illness Mother     Hypertension Mother     Migraine headaches Mother     Mental illness Father     Obesity Maternal Grandmother     Colon cancer Neg Hx         Social History     Socioeconomic History    Marital status: Single   Tobacco Use    Smoking status: Never    Smokeless tobacco: Never   Vaping Use    Vaping status: Never Used   Substance and Sexual Activity    Alcohol use: Never    Drug use: Never    Sexual activity: Defer       Review of Systems   Constitutional:  Negative for chills, diaphoresis, fatigue and fever.   HENT:  Negative for congestion and sore throat.    Respiratory:  Negative for cough.    Cardiovascular:  Negative for chest pain.   Gastrointestinal:  Positive for abdominal pain. Negative for anorexia, nausea and vomiting.   Genitourinary:  Negative for dysuria.   Musculoskeletal:  Negative for joint pain, myalgias and neck pain.   Skin:  Negative for rash.   Neurological:  Negative for vertigo, weakness, numbness and headaches.         Objective  Vitals:    12/19/24 1004   BP: 100/70   BP Location: Left arm   Patient Position: Sitting   Cuff Size: Large Adult   Pulse: 76   Temp: 96.8 °F (36 °C)   TempSrc: Temporal   SpO2: 98%   Weight: 94.7 kg (208 lb 12.8 oz)   Height: 154.9 cm (60.98\")     Body mass index is 39.47 kg/m².     Physical Exam  Physical Exam  Vitals and nursing note reviewed.   Constitutional:       General: She is not in acute distress.     Appearance: She is not ill-appearing.   HENT:      Head: Normocephalic.      Right Ear: Tympanic membrane, ear canal and external ear normal. There is no impacted cerumen.      Left Ear: Tympanic membrane, ear canal and external ear normal. There is no impacted cerumen.      Nose: No congestion or rhinorrhea.      Mouth/Throat:      Mouth: Mucous membranes are moist.      Pharynx: " Oropharynx is clear. No oropharyngeal exudate or posterior oropharyngeal erythema.   Eyes:      General:         Right eye: No discharge.         Left eye: No discharge.      Extraocular Movements: Extraocular movements intact.      Conjunctiva/sclera: Conjunctivae normal.      Pupils: Pupils are equal, round, and reactive to light.   Cardiovascular:      Rate and Rhythm: Normal rate and regular rhythm.      Heart sounds: Normal heart sounds. No murmur heard.     No friction rub. No gallop.   Pulmonary:      Effort: Pulmonary effort is normal. No respiratory distress.      Breath sounds: Normal breath sounds. No wheezing.   Abdominal:      General: Bowel sounds are normal. There is no distension.      Palpations: Abdomen is soft. There is no mass.      Tenderness: There is no abdominal tenderness.   Musculoskeletal:         General: No swelling. Normal range of motion.      Cervical back: Normal range of motion. No tenderness.      Right lower leg: No edema.      Left lower leg: No edema.   Lymphadenopathy:      Cervical: No cervical adenopathy.   Skin:     Findings: No bruising, erythema or rash.   Neurological:      Mental Status: She is oriented to person, place, and time.      Gait: Gait normal.   Psychiatric:         Mood and Affect: Mood normal.         Behavior: Behavior normal.         Thought Content: Thought content normal.         Judgment: Judgment normal.         Diagnostic Data  Procedures    Assessment  Diagnoses and all orders for this visit:    1. NYDIA (generalized anxiety disorder) (Primary)    2. Irritable bowel syndrome with diarrhea    3. Gastroesophageal reflux disease, unspecified whether esophagitis present    4. B12 deficiency    5. Class 2 obesity due to excess calories with body mass index (BMI) of 39.0 to 39.9 in adult, unspecified whether serious comorbidity present  -     Ambulatory Referral to Nutrition Services        Plan    1. NYDIA (generalized anxiety disorder) (Primary)- stable on  Lexapro.    2. Irritable bowel syndrome with diarrhea- stable on Linzess.    3. Gastroesophageal reflux disease, unspecified whether esophagitis present- stable on the Voquezna.    4. B12 deficiency- stable on vitamin B12.    5. Class 2 obesity due to excess calories with body mass index (BMI) of 39.0 to 39.9 in adult, unspecified whether serious comorbidity present- Ambulatory Referral to Nutrition Services      Return in about 3 months (around 3/19/2025) for Recheck.    Krishan Cast PA-C  12/19/2024  Answers submitted by the patient for this visit:  Primary Reason for Visit (Submitted on 12/19/2024)  What is the primary reason for your visit?: Problem Not Listed

## 2024-12-30 RX ORDER — ESCITALOPRAM OXALATE 10 MG/1
10 TABLET ORAL DAILY
Qty: 30 TABLET | Refills: 1 | Status: SHIPPED | OUTPATIENT
Start: 2024-12-30

## 2025-01-03 ENCOUNTER — HOSPITAL ENCOUNTER (OUTPATIENT)
Dept: NUTRITION | Facility: HOSPITAL | Age: 20
Setting detail: RECURRING SERIES
Discharge: HOME | End: 2025-01-03

## 2025-01-03 PROCEDURE — 97802 MEDICAL NUTRITION INDIV IN: CPT

## 2025-01-03 NOTE — CONSULTS
[facility] Nutrition Services          Initial 60 Minute Nutrition Visit    Date: 2025   Patient Name: Jorge De Los Santos  : 2005   MRN: [id number]   Referring Provider: Krishan Cast, *    Reason for Visit: Nutrition counseling for weight loss  Visit Format: IP    Nutrition Assessment       Social History:   Social History     Socioeconomic History    Marital status: Single   Tobacco Use    Smoking status: Never    Smokeless tobacco: Never   Vaping Use    Vaping status: Never Used   Substance and Sexual Activity    Alcohol use: Never    Drug use: Never    Sexual activity: Defer     Active Problem List: There are no active problems to display for this patient.     Current Medications:   Current Outpatient Medications:     escitalopram (Lexapro) 10 MG tablet, Take 1 tablet by mouth Daily., Disp: 30 tablet, Rfl: 1    linaclotide (LINZESS) 145 MCG capsule capsule, Take 1 capsule by mouth Every Morning Before Breakfast., Disp: 90 capsule, Rfl: 3    vitamin B-12 (CYANOCOBALAMIN) 1000 MCG tablet, Take 1 tablet by mouth Daily., Disp: 90 tablet, Rfl: 1    Vonoprazan Fumarate (Voquezna) 10 MG tablet, Take 1 tablet by mouth Daily., Disp: 30 tablet, Rfl: 11    Labs: WBC 10.3    Hunger Vital Sign Food Insecurity Assessment:  Within the past 12 months I/we worried whether our food would run out before I/we got money to buy more: Did not discuss   Within the past 12 months the food I/we bought just didn't last and I/we didn't have money to get more: Did not discuss   Use of food assistance programs (WIC, food stamps, food goel) Did not discuss       Food & Nutrition Related History       Food Allergies: None noted  Food Intolerances: None noted  Food Behavior: None  Nutrition Impact Symptoms: None  Gastrointestinal conditions that impact intake or food choices: Pt stated that she has IBS  Details at home: Goes to [employer], works full time in retail at [employer]  Who prepares most meals:  "Pt  Who does grocery shopping: Pt  How many meals are purchased from fast food/sit down restaurants per week: Did not discuss  Difficulty chewin - Normal  Difficulty swallowin - Normal  Diet requirement related to personal preference or cultural belief: None  History of eating disorder/disordered eating habits: None  Language/communication details: Speaks english  Barriers to learning: No barriers identified at this time    24 Hour Recall:   Time Food/beverages consumed   Breakfast Water, banana, sometimes may not have breakfast   Lunch Spaghetti with meat, sometimes a salad   Dinner Spaghetti   Water Drinks about 4 16 oz water bottles a day                     Additional comments: N/A    Anthropometrics      Height:   Ht Readings from Last 1 Encounters:   24 154.9 cm (60.98\")     Weight:   Wt Readings from Last 3 Encounters:   24 94.7 kg (208 lb 12.8 oz)   10/29/24 90.6 kg (199 lb 12.8 oz)   24 89.8 kg (198 lb)     BMI: There is no height or weight on file to calculate BMI.   Weight Change: Did not discuss     Physical Activity     Activity  Frequency Duration    Walks Most days of the week and at the gym    Strength training A few times a week                               Barriers to physical activity: N/A     Physical activity comments: N/A     Estimated Needs     Estimated Energy Needs: Did not discuss     Estimated Protein Needs: Did not discuss     Estimated Fluid Needs: Gave pt recommendation of 64 oz a day     Discussion / Education      Pt is a 19 year old female who was referred for nutrition counseling for weight loss. 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 Pt states that she works 6 days a week during the school year. Pt states that she has IBS. Pt states that she would like to lose weight. Pt states that she has tried different diets before. Pt showed knowledge of information discussed in session by being able to identify a " carbohydrate, fat, protein and fiber source.     RD discussed with pt macronutrient education. RD discussed with pt about how to create a balanced plate. RD discussed with pt about different meal ideas.     Assessment of patient engagement: Engaged    Measurement of understanding: Patient verbalized understanding, Patient able to demonstrate understanding with teach back     Resources Provided: Macronutrient packet, high fiber snacks and foods    Goal (s)      Goal 1: Learn about carbohydrates, fats and protein       Goal 2: Do more strength training         Plan of Care     PES Statement:   Overweight / Obesity related to diet and lifestye as evidenced by BMI.     Follow Up Visit      Follow Up:   2/13/2025 1:15 PM    Total of 60 minutes spent with patient on nutrition counseling. Education based on Academy of Nutrition and Dietetics guidelines. Patient was provided with RD's contact information. Thank you for this referral.

## 2025-01-16 RX ORDER — ESCITALOPRAM OXALATE 10 MG/1
10 TABLET ORAL DAILY
Qty: 30 TABLET | Refills: 1 | OUTPATIENT
Start: 2025-01-16

## 2025-01-16 RX ORDER — LANOLIN ALCOHOL/MO/W.PET/CERES
1000 CREAM (GRAM) TOPICAL DAILY
Qty: 90 TABLET | Refills: 1 | OUTPATIENT
Start: 2025-01-16

## 2025-01-23 RX ORDER — ESCITALOPRAM OXALATE 10 MG/1
10 TABLET ORAL DAILY
Qty: 30 TABLET | Refills: 1 | Status: SHIPPED | OUTPATIENT
Start: 2025-01-23

## 2025-02-06 ENCOUNTER — OFFICE VISIT (OUTPATIENT)
Dept: INTERNAL MEDICINE | Facility: CLINIC | Age: 20
End: 2025-02-06
Payer: COMMERCIAL

## 2025-02-06 VITALS
OXYGEN SATURATION: 99 % | BODY MASS INDEX: 40.14 KG/M2 | HEART RATE: 88 BPM | DIASTOLIC BLOOD PRESSURE: 68 MMHG | HEIGHT: 61 IN | TEMPERATURE: 98.2 F | SYSTOLIC BLOOD PRESSURE: 114 MMHG | WEIGHT: 212.6 LBS

## 2025-02-06 DIAGNOSIS — F41.1 GAD (GENERALIZED ANXIETY DISORDER): ICD-10-CM

## 2025-02-06 DIAGNOSIS — K21.9 GASTROESOPHAGEAL REFLUX DISEASE, UNSPECIFIED WHETHER ESOPHAGITIS PRESENT: ICD-10-CM

## 2025-02-06 DIAGNOSIS — R22.1 LOCALIZED SWELLING, MASS AND LUMP, NECK: ICD-10-CM

## 2025-02-06 DIAGNOSIS — K58.0 IRRITABLE BOWEL SYNDROME WITH DIARRHEA: ICD-10-CM

## 2025-02-06 DIAGNOSIS — R07.2 PRECORDIAL PAIN: Primary | ICD-10-CM

## 2025-02-06 PROCEDURE — 93000 ELECTROCARDIOGRAM COMPLETE: CPT | Performed by: PHYSICIAN ASSISTANT

## 2025-02-06 PROCEDURE — 1160F RVW MEDS BY RX/DR IN RCRD: CPT | Performed by: PHYSICIAN ASSISTANT

## 2025-02-06 PROCEDURE — 99214 OFFICE O/P EST MOD 30 MIN: CPT | Performed by: PHYSICIAN ASSISTANT

## 2025-02-06 PROCEDURE — 1159F MED LIST DOCD IN RCRD: CPT | Performed by: PHYSICIAN ASSISTANT

## 2025-02-06 PROCEDURE — 1126F AMNT PAIN NOTED NONE PRSNT: CPT | Performed by: PHYSICIAN ASSISTANT

## 2025-02-06 NOTE — PROGRESS NOTES
[facility] PRIMARY [facility]  [address]  [facility] [address]  Dept: [phone]  Dept Fax: [phone]  Loc: [phone]  Loc Fax: [phone]    Jorge De Los Santos  2005    Follow Up Office Visit Note    History of Present Illness:  Patient is a 19-year-old female in today for chest pain.  Pains come and go.  She has had the symptoms in the past.  Of come back over the last few weeks.  Denies any aggravation or alleviation of symptoms with activity or rest.    Patient also having some swelling on the anterior part of her neck.  Would like further evaluation.    Chest Pain   Pertinent negatives include no cough, dizziness, fever, headaches, nausea, palpitations, shortness of breath or vomiting.       The following portions of the patient's history were reviewed and updated as appropriate: allergies, current medications, past family history, past medical history, past social history, past surgical history, and problem list.    Medications:    Current Outpatient Medications:     escitalopram (Lexapro) 10 MG tablet, Take 1 tablet by mouth Daily., Disp: 30 tablet, Rfl: 1    linaclotide (LINZESS) 145 MCG capsule capsule, Take 1 capsule by mouth Every Morning Before Breakfast., Disp: 90 capsule, Rfl: 3    vitamin B-12 (CYANOCOBALAMIN) 1000 MCG tablet, Take 1 tablet by mouth Daily. (Patient not taking: Reported on 2/6/2025), Disp: 90 tablet, Rfl: 1    Vonoprazan Fumarate (Voquezna) 10 MG tablet, Take 1 tablet by mouth Daily. (Patient not taking: Reported on 2/6/2025), Disp: 30 tablet, Rfl: 11    Subjective  No Known Allergies     Past Medical History:   Diagnosis Date    Acid reflux     Asthma     Depression     History of migraine headaches     IBS (irritable bowel syndrome)        Past Surgical History:   Procedure Laterality Date    THYROIDECTOMY      THYROIDECTOMY, PARTIAL         Family History   Problem Relation Age of Onset    Mental illness Mother     Hypertension  "Mother     Migraine headaches Mother     Mental illness Father     Obesity Maternal Grandmother     Colon cancer Neg Hx         Social History     Socioeconomic History    Marital status: Single   Tobacco Use    Smoking status: Never    Smokeless tobacco: Never   Vaping Use    Vaping status: Never Used   Substance and Sexual Activity    Alcohol use: Never    Drug use: Never    Sexual activity: Defer       Review of Systems   Constitutional:  Negative for activity change, chills, fatigue, fever and unexpected weight change.   HENT:  Negative for congestion, ear pain, postnasal drip, sinus pressure and sore throat.    Eyes:  Negative for pain, discharge and redness.   Respiratory:  Negative for cough, shortness of breath and wheezing.    Cardiovascular:  Positive for chest pain. Negative for palpitations and leg swelling.   Gastrointestinal:  Negative for diarrhea, nausea and vomiting.   Endocrine: Negative for cold intolerance and heat intolerance.   Genitourinary:  Negative for decreased urine volume and dysuria.   Musculoskeletal:  Negative for arthralgias and myalgias.   Skin:  Negative for rash and wound.   Neurological:  Negative for dizziness, light-headedness and headaches.   Hematological:  Does not bruise/bleed easily.   Psychiatric/Behavioral:  Negative for confusion, dysphoric mood and sleep disturbance. The patient is not nervous/anxious.          Objective  Vitals:    02/06/25 1358   BP: 114/68   BP Location: Left arm   Patient Position: Sitting   Cuff Size: Large Adult   Pulse: 88   Temp: 98.2 °F (36.8 °C)   TempSrc: Temporal   SpO2: 99%   Weight: 96.4 kg (212 lb 9.6 oz)   Height: 154.9 cm (60.98\")     Body mass index is 40.19 kg/m².     Physical Exam  Physical Exam  Vitals and nursing note reviewed.   Constitutional:       General: She is not in acute distress.     Appearance: She is not ill-appearing.   HENT:      Head: Normocephalic.      Right Ear: Tympanic membrane, ear canal and external ear " normal. There is no impacted cerumen.      Left Ear: Tympanic membrane, ear canal and external ear normal. There is no impacted cerumen.      Nose: No congestion or rhinorrhea.      Mouth/Throat:      Mouth: Mucous membranes are moist.      Pharynx: Oropharynx is clear. No oropharyngeal exudate or posterior oropharyngeal erythema.   Eyes:      General:         Right eye: No discharge.         Left eye: No discharge.      Extraocular Movements: Extraocular movements intact.      Conjunctiva/sclera: Conjunctivae normal.      Pupils: Pupils are equal, round, and reactive to light.   Cardiovascular:      Rate and Rhythm: Normal rate and regular rhythm.      Heart sounds: Normal heart sounds. No murmur heard.     No friction rub. No gallop.   Pulmonary:      Effort: Pulmonary effort is normal. No respiratory distress.      Breath sounds: Normal breath sounds. No wheezing.   Abdominal:      General: Bowel sounds are normal. There is no distension.      Palpations: Abdomen is soft. There is no mass.      Tenderness: There is no abdominal tenderness.   Musculoskeletal:         General: No swelling. Normal range of motion.      Cervical back: Normal range of motion. No tenderness.      Right lower leg: No edema.      Left lower leg: No edema.   Lymphadenopathy:      Cervical: No cervical adenopathy.   Skin:     Findings: No bruising, erythema or rash.   Neurological:      Mental Status: She is oriented to person, place, and time.      Gait: Gait normal.   Psychiatric:         Mood and Affect: Mood normal.         Behavior: Behavior normal.         Thought Content: Thought content normal.         Judgment: Judgment normal.       Diagnostic Data    ECG 12 Lead    Date/Time: 2/6/2025 2:38 PM  Performed by: Krishan Cast PA-C    Authorized by: Krishan Cast PA-C  Comparison: compared with previous ECG   Similar to previous ECG  Comparison to previous ECG: New PVCs  Rhythm: sinus rhythm  Ectopy: infrequent  PVCs  Rate: normal  QRS axis: normal    Clinical impression: abnormal EKG      Assessment  Diagnoses and all orders for this visit:    1. Precordial pain (Primary)    2. Localized swelling, mass and lump, neck  -     US Head Neck Soft Tissue; Future    3. NYDIA (generalized anxiety disorder)    4. Irritable bowel syndrome with diarrhea    5. Gastroesophageal reflux disease, unspecified whether esophagitis present        Plan    1. Precordial pain (Primary)- asymptomatic today.  EKG in office reveals sinus rhythm with occasional PVCs septal MI of indeterminate age without any evidence of acute ST changes.  Advised for return of symptoms or for any additional cardiac symptoms to call 911 immediately.  Advised to follow-up with cardiology ASAP.    2. Localized swelling, mass and lump, neck- ordered ultrasound.    3. NYDIA (generalized anxiety disorder)- stable on Lexapro.    4. Irritable bowel syndrome with diarrhea- stable on Linzess.    5. Gastroesophageal reflux disease, unspecified whether esophagitis- present stable on Voquezna.      Return in about 4 weeks (around 3/6/2025) for Recheck.    Krishan Cast PA-C  02/06/2025

## 2025-02-20 ENCOUNTER — TELEPHONE (OUTPATIENT)
Dept: INTERNAL MEDICINE | Facility: CLINIC | Age: 20
End: 2025-02-20

## 2025-02-20 NOTE — TELEPHONE ENCOUNTER
Pt did go to the ER. She stated that they did not find anything wrong but said that it could be musculoskeletal. Advised her to look into getting a breast reduction. I have scheduled follow up appt.

## 2025-02-20 NOTE — TELEPHONE ENCOUNTER
Caller: Jorge De Los Santos    Relationship to patient: Self    Best call back number: [phone]     Chief complaint: CHEST TIGHTNESS AND PAIN (GETTING WORSE)    Patient directed to call 911 or go to their nearest emergency room.     Patient verbalized understanding: [x] Yes  [] No  If no, why?    Additional notes: PATIENT STATES THAT SHE WAS HAVING CHEST PAINS AND TIGHTNESS AND THAT IT WAS GETTING WORSE    PATIENT WAS CONSIDERING GOING TO THE EMERGENCY ROOM AND WAS ENCOURAGED TO DO SO    PLEASE BE ADVISED

## 2025-02-20 NOTE — TELEPHONE ENCOUNTER
Caller: Jorge De Los Santos    Relationship: Self    Best call back number: [phone]     What specialty or service is being requested: CARDIOLOGY    Any additional details: PATIENT CALLED TO REQUEST A REFERRAL TO CARDIOLOGY, AND STATES THAT SHE HAS DISCUSSED THIS REFERRAL WITH ESTEPHANIA PREVIOUSLY    PLEASE ADVISE

## 2025-02-27 ENCOUNTER — OFFICE VISIT (OUTPATIENT)
Dept: INTERNAL MEDICINE | Facility: CLINIC | Age: 20
End: 2025-02-27
Payer: COMMERCIAL

## 2025-02-27 VITALS
BODY MASS INDEX: 40.44 KG/M2 | WEIGHT: 214.2 LBS | OXYGEN SATURATION: 98 % | HEART RATE: 90 BPM | TEMPERATURE: 98.4 F | HEIGHT: 61 IN | DIASTOLIC BLOOD PRESSURE: 70 MMHG | SYSTOLIC BLOOD PRESSURE: 116 MMHG

## 2025-02-27 DIAGNOSIS — R22.1 LOCALIZED SWELLING, MASS AND LUMP, NECK: ICD-10-CM

## 2025-02-27 DIAGNOSIS — R07.9 CHEST PAIN, UNSPECIFIED TYPE: Primary | ICD-10-CM

## 2025-02-27 DIAGNOSIS — F41.1 GAD (GENERALIZED ANXIETY DISORDER): ICD-10-CM

## 2025-02-27 DIAGNOSIS — K58.0 IRRITABLE BOWEL SYNDROME WITH DIARRHEA: ICD-10-CM

## 2025-02-27 PROCEDURE — 1160F RVW MEDS BY RX/DR IN RCRD: CPT | Performed by: PHYSICIAN ASSISTANT

## 2025-02-27 PROCEDURE — 1126F AMNT PAIN NOTED NONE PRSNT: CPT | Performed by: PHYSICIAN ASSISTANT

## 2025-02-27 PROCEDURE — 1159F MED LIST DOCD IN RCRD: CPT | Performed by: PHYSICIAN ASSISTANT

## 2025-02-27 PROCEDURE — 99214 OFFICE O/P EST MOD 30 MIN: CPT | Performed by: PHYSICIAN ASSISTANT

## 2025-02-27 NOTE — PROGRESS NOTES
"[facility] PRIMARY [facility]  [address]  [facility] [address]  Dept: [phone]  Dept Fax: [phone]  Loc: [phone]  Loc Fax: [phone]    Jorge De Los Santos  2005    Follow Up Office Visit Note    History of Present Illness:  Patient 19-year-old male in today for ER follow-up for chest pain.  Course was as follows: \"19-year-old female presented to ED with chest pain similar to chest pain she has had on multiple occasions since February. Labs obtained in emergency department were nonactionable/nonconcerning with no evidence of cardiac cause of her chest pain with a troponin of less than 4. EKG showed no STEMI or signs of acute ischemia as reviewed by Dr. Cochran. CXR showed no acute cardiopulmonary process as reviewed by myself and Dr. Cochran, patient be contacted with discrepancies between ED and radiologist interpretation. Patient is already seen a cardiologist for this and had an echo with normal left ventricular ejection fraction of 55% but stated she is also scheduled for a follow-up with them this month. Patient advised to follow-up with primary care for persistent symptoms, given strict return precautions, indicated understanding and agreed to plan.\" Overall feeling better. Called to schedule appt w/ cardiology.        The following portions of the patient's history were reviewed and updated as appropriate: allergies, current medications, past family history, past medical history, past social history, past surgical history, and problem list.    Medications:    Current Outpatient Medications:     escitalopram (Lexapro) 10 MG tablet, Take 1 tablet by mouth Daily., Disp: 30 tablet, Rfl: 1    linaclotide (LINZESS) 145 MCG capsule capsule, Take 1 capsule by mouth Every Morning Before Breakfast., Disp: 90 capsule, Rfl: 3    NAPROXEN PO, Take 500 mg by mouth As Needed., Disp: , Rfl:     Subjective  No Known Allergies     Past Medical History:   Diagnosis " "Date    Acid reflux     Asthma     Depression     History of migraine headaches     IBS (irritable bowel syndrome)        Past Surgical History:   Procedure Laterality Date    THYROIDECTOMY      THYROIDECTOMY, PARTIAL         Family History   Problem Relation Age of Onset    Mental illness Mother     Hypertension Mother     Migraine headaches Mother     Mental illness Father     Obesity Maternal Grandmother     Colon cancer Neg Hx         Social History     Socioeconomic History    Marital status: Single   Tobacco Use    Smoking status: Never    Smokeless tobacco: Never   Vaping Use    Vaping status: Never Used   Substance and Sexual Activity    Alcohol use: Never    Drug use: Never    Sexual activity: Defer       Review of Systems   Constitutional:  Negative for activity change, chills, fatigue, fever and unexpected weight change.   HENT:  Negative for congestion, ear pain, postnasal drip, sinus pressure and sore throat.    Eyes:  Negative for pain, discharge and redness.   Respiratory:  Negative for cough, shortness of breath and wheezing.    Cardiovascular:  Negative for chest pain, palpitations and leg swelling.   Gastrointestinal:  Negative for diarrhea, nausea and vomiting.   Endocrine: Negative for cold intolerance and heat intolerance.   Genitourinary:  Negative for decreased urine volume and dysuria.   Musculoskeletal:  Negative for arthralgias and myalgias.   Skin:  Negative for rash and wound.   Neurological:  Negative for dizziness, light-headedness and headaches.   Hematological:  Does not bruise/bleed easily.   Psychiatric/Behavioral:  Negative for confusion, dysphoric mood and sleep disturbance. The patient is not nervous/anxious.          Objective  Vitals:    02/27/25 1335   BP: 116/70   BP Location: Left arm   Patient Position: Sitting   Cuff Size: Large Adult   Pulse: 90   Temp: 98.4 °F (36.9 °C)   TempSrc: Temporal   SpO2: 98%   Weight: 97.2 kg (214 lb 3.2 oz)   Height: 154.9 cm (60.98\")     Body " mass index is 40.49 kg/m².     Physical Exam  Physical Exam  Vitals and nursing note reviewed.   Constitutional:       General: She is not in acute distress.     Appearance: She is not ill-appearing.   HENT:      Head: Normocephalic.      Right Ear: Tympanic membrane, ear canal and external ear normal. There is no impacted cerumen.      Left Ear: Tympanic membrane, ear canal and external ear normal. There is no impacted cerumen.      Nose: No congestion or rhinorrhea.      Mouth/Throat:      Mouth: Mucous membranes are moist.      Pharynx: Oropharynx is clear. No oropharyngeal exudate or posterior oropharyngeal erythema.   Eyes:      General:         Right eye: No discharge.         Left eye: No discharge.      Extraocular Movements: Extraocular movements intact.      Conjunctiva/sclera: Conjunctivae normal.      Pupils: Pupils are equal, round, and reactive to light.   Cardiovascular:      Rate and Rhythm: Normal rate and regular rhythm.      Heart sounds: Normal heart sounds. No murmur heard.     No friction rub. No gallop.   Pulmonary:      Effort: Pulmonary effort is normal. No respiratory distress.      Breath sounds: Normal breath sounds. No wheezing.   Abdominal:      General: Bowel sounds are normal. There is no distension.      Palpations: Abdomen is soft. There is no mass.      Tenderness: There is no abdominal tenderness.   Musculoskeletal:         General: No swelling. Normal range of motion.      Cervical back: Normal range of motion. No tenderness.      Right lower leg: No edema.      Left lower leg: No edema.   Lymphadenopathy:      Cervical: No cervical adenopathy.   Skin:     Findings: No bruising, erythema or rash.   Neurological:      Mental Status: She is oriented to person, place, and time.      Gait: Gait normal.   Psychiatric:         Mood and Affect: Mood normal.         Behavior: Behavior normal.         Thought Content: Thought content normal.         Judgment: Judgment normal.          Diagnostic Data  Procedures    Assessment  Diagnoses and all orders for this visit:    1. Chest pain, unspecified type (Primary)    2. Irritable bowel syndrome with diarrhea    3. NYDIA (generalized anxiety disorder)    4. Localized swelling, mass and lump, neck        Plan    1. Chest pain, unspecified type (Primary)- overall better. Advised if sx do not cont to improve or worsen or for any other cardiac sx to call 911. Patient verbalized understanding of all instructions given and complied.     2. Irritable bowel syndrome with diarrhea- stable on Linzess.    3. NYDIA (generalized anxiety disorder)- stable on Lexapro.    4. Localized swelling, mass and lump, neck- awaiting ultrasound.      Return in 3 months (on 5/27/2025), or if symptoms worsen or fail to improve, for Recheck.    Krishan Cast PA-C  02/27/2025

## 2025-04-08 RX ORDER — ESCITALOPRAM OXALATE 10 MG/1
10 TABLET ORAL DAILY
Qty: 30 TABLET | Refills: 1 | Status: SHIPPED | OUTPATIENT
Start: 2025-04-08

## 2025-05-07 RX ORDER — ESCITALOPRAM OXALATE 10 MG/1
10 TABLET ORAL DAILY
Qty: 30 TABLET | Refills: 1 | Status: SHIPPED | OUTPATIENT
Start: 2025-05-07

## 2025-07-21 RX ORDER — ESCITALOPRAM OXALATE 10 MG/1
10 TABLET ORAL DAILY
Qty: 30 TABLET | Refills: 1 | Status: SHIPPED | OUTPATIENT
Start: 2025-07-21

## 2025-08-28 RX ORDER — ESCITALOPRAM OXALATE 10 MG/1
10 TABLET ORAL DAILY
Qty: 30 TABLET | Refills: 1 | Status: SHIPPED | OUTPATIENT
Start: 2025-08-28